# Patient Record
Sex: FEMALE | Race: WHITE | Employment: FULL TIME | URBAN - METROPOLITAN AREA
[De-identification: names, ages, dates, MRNs, and addresses within clinical notes are randomized per-mention and may not be internally consistent; named-entity substitution may affect disease eponyms.]

---

## 2023-04-26 ENCOUNTER — HOSPITAL ENCOUNTER (OUTPATIENT)
Facility: HOSPITAL | Age: 58
Discharge: HOME | End: 2023-04-29
Payer: OTHER GOVERNMENT

## 2023-04-26 DIAGNOSIS — M47.26 OTHER SPONDYLOSIS WITH RADICULOPATHY, LUMBAR REGION: ICD-10-CM

## 2023-04-26 DIAGNOSIS — M43.17 ACQUIRED SPONDYLOLISTHESIS OF LUMBOSACRAL REGION: ICD-10-CM

## 2023-04-26 LAB
ABO + RH BLD: NORMAL
ALBUMIN SERPL-MCNC: 3.8 G/DL (ref 3.4–5)
ALBUMIN/GLOB SERPL: 1 (ref 0.8–1.7)
ALP SERPL-CCNC: 51 U/L (ref 45–117)
ALT SERPL-CCNC: 32 U/L (ref 13–56)
ANION GAP SERPL CALC-SCNC: 2 MMOL/L (ref 3–18)
APTT PPP: 32.4 SEC (ref 23–36.4)
AST SERPL-CCNC: 27 U/L (ref 10–38)
BILIRUB SERPL-MCNC: 0.4 MG/DL (ref 0.2–1)
BLOOD GROUP ANTIBODIES SERPL: NORMAL
BUN SERPL-MCNC: 10 MG/DL (ref 7–18)
BUN/CREAT SERPL: 16 (ref 12–20)
CALCIUM SERPL-MCNC: 9.1 MG/DL (ref 8.5–10.1)
CHLORIDE SERPL-SCNC: 109 MMOL/L (ref 100–111)
CO2 SERPL-SCNC: 25 MMOL/L (ref 21–32)
CREAT SERPL-MCNC: 0.61 MG/DL (ref 0.6–1.3)
ERYTHROCYTE [DISTWIDTH] IN BLOOD BY AUTOMATED COUNT: 12.3 % (ref 11.6–14.5)
GLOBULIN SER CALC-MCNC: 3.8 G/DL (ref 2–4)
GLUCOSE SERPL-MCNC: 128 MG/DL (ref 74–99)
HCT VFR BLD AUTO: 41.2 % (ref 35–45)
HGB BLD-MCNC: 13.6 G/DL (ref 12–16)
INR PPP: 0.9 (ref 0.8–1.2)
MCH RBC QN AUTO: 30.6 PG (ref 24–34)
MCHC RBC AUTO-ENTMCNC: 33 G/DL (ref 31–37)
MCV RBC AUTO: 92.6 FL (ref 78–100)
NRBC # BLD: 0 K/UL (ref 0–0.01)
NRBC BLD-RTO: 0 PER 100 WBC
PLATELET # BLD AUTO: 287 K/UL (ref 135–420)
PMV BLD AUTO: 9.2 FL (ref 9.2–11.8)
POTASSIUM SERPL-SCNC: 4.4 MMOL/L (ref 3.5–5.5)
PROT SERPL-MCNC: 7.6 G/DL (ref 6.4–8.2)
PROTHROMBIN TIME: 13 SEC (ref 11.5–15.2)
RBC # BLD AUTO: 4.45 M/UL (ref 4.2–5.3)
SODIUM SERPL-SCNC: 136 MMOL/L (ref 136–145)
SPECIMEN EXP DATE BLD: NORMAL
WBC # BLD AUTO: 8.4 K/UL (ref 4.6–13.2)

## 2023-04-26 PROCEDURE — 86850 RBC ANTIBODY SCREEN: CPT

## 2023-04-26 PROCEDURE — 86901 BLOOD TYPING SEROLOGIC RH(D): CPT

## 2023-04-26 PROCEDURE — 93005 ELECTROCARDIOGRAM TRACING: CPT

## 2023-04-26 PROCEDURE — 85730 THROMBOPLASTIN TIME PARTIAL: CPT

## 2023-04-26 PROCEDURE — 36415 COLL VENOUS BLD VENIPUNCTURE: CPT

## 2023-04-26 PROCEDURE — 85027 COMPLETE CBC AUTOMATED: CPT

## 2023-04-26 PROCEDURE — 85610 PROTHROMBIN TIME: CPT

## 2023-04-26 PROCEDURE — 86900 BLOOD TYPING SEROLOGIC ABO: CPT

## 2023-04-26 PROCEDURE — 80053 COMPREHEN METABOLIC PANEL: CPT

## 2023-04-27 PROBLEM — M54.10 RADICULOPATHY OF LEG: Status: ACTIVE | Noted: 2023-04-27

## 2023-04-27 PROBLEM — M48.061 LUMBAR SPINAL STENOSIS: Status: ACTIVE | Noted: 2023-04-27

## 2023-04-27 PROBLEM — M47.816 LUMBAR SPONDYLOSIS: Status: ACTIVE | Noted: 2023-04-27

## 2023-04-27 LAB
BACTERIA SPEC CULT: NORMAL
BACTERIA SPEC CULT: NORMAL
EKG ATRIAL RATE: 61 BPM
EKG DIAGNOSIS: NORMAL
EKG P AXIS: 66 DEGREES
EKG P-R INTERVAL: 216 MS
EKG Q-T INTERVAL: 424 MS
EKG QRS DURATION: 76 MS
EKG QTC CALCULATION (BAZETT): 426 MS
EKG R AXIS: 78 DEGREES
EKG T AXIS: 66 DEGREES
EKG VENTRICULAR RATE: 61 BPM
SERVICE CMNT-IMP: NORMAL

## 2023-05-03 ENCOUNTER — ANESTHESIA EVENT (OUTPATIENT)
Facility: HOSPITAL | Age: 58
End: 2023-05-03
Payer: OTHER GOVERNMENT

## 2023-05-04 ENCOUNTER — APPOINTMENT (OUTPATIENT)
Facility: HOSPITAL | Age: 58
End: 2023-05-04
Attending: ORTHOPAEDIC SURGERY
Payer: OTHER GOVERNMENT

## 2023-05-04 ENCOUNTER — ANESTHESIA (OUTPATIENT)
Facility: HOSPITAL | Age: 58
End: 2023-05-04
Payer: OTHER GOVERNMENT

## 2023-05-04 ENCOUNTER — HOSPITAL ENCOUNTER (OUTPATIENT)
Facility: HOSPITAL | Age: 58
Discharge: HOME | End: 2023-05-06
Attending: ORTHOPAEDIC SURGERY | Admitting: ORTHOPAEDIC SURGERY
Payer: OTHER GOVERNMENT

## 2023-05-04 DIAGNOSIS — M48.061 SPINAL STENOSIS OF LUMBAR REGION AT MULTIPLE LEVELS: Primary | ICD-10-CM

## 2023-05-04 PROCEDURE — 6360000002 HC RX W HCPCS: Performed by: ORTHOPAEDIC SURGERY

## 2023-05-04 PROCEDURE — 97530 THERAPEUTIC ACTIVITIES: CPT

## 2023-05-04 PROCEDURE — 2580000003 HC RX 258: Performed by: ANESTHESIOLOGY

## 2023-05-04 PROCEDURE — 2500000003 HC RX 250 WO HCPCS: Performed by: ANESTHESIOLOGY

## 2023-05-04 PROCEDURE — 2580000003 HC RX 258: Performed by: PHYSICIAN ASSISTANT

## 2023-05-04 PROCEDURE — A4216 STERILE WATER/SALINE, 10 ML: HCPCS | Performed by: PHYSICIAN ASSISTANT

## 2023-05-04 PROCEDURE — 2580000003 HC RX 258: Performed by: ORTHOPAEDIC SURGERY

## 2023-05-04 PROCEDURE — C1763 CONN TISS, NON-HUMAN: HCPCS | Performed by: ORTHOPAEDIC SURGERY

## 2023-05-04 PROCEDURE — 3600000012 HC SURGERY LEVEL 2 ADDTL 15MIN: Performed by: ORTHOPAEDIC SURGERY

## 2023-05-04 PROCEDURE — C1713 ANCHOR/SCREW BN/BN,TIS/BN: HCPCS | Performed by: ORTHOPAEDIC SURGERY

## 2023-05-04 PROCEDURE — 2720000010 HC SURG SUPPLY STERILE: Performed by: ORTHOPAEDIC SURGERY

## 2023-05-04 PROCEDURE — 97162 PT EVAL MOD COMPLEX 30 MIN: CPT

## 2023-05-04 PROCEDURE — 3700000000 HC ANESTHESIA ATTENDED CARE: Performed by: ORTHOPAEDIC SURGERY

## 2023-05-04 PROCEDURE — 6360000002 HC RX W HCPCS: Performed by: PHYSICIAN ASSISTANT

## 2023-05-04 PROCEDURE — C1889 IMPLANT/INSERT DEVICE, NOC: HCPCS | Performed by: ORTHOPAEDIC SURGERY

## 2023-05-04 PROCEDURE — 3700000001 HC ADD 15 MINUTES (ANESTHESIA): Performed by: ORTHOPAEDIC SURGERY

## 2023-05-04 PROCEDURE — 2709999900 HC NON-CHARGEABLE SUPPLY: Performed by: ORTHOPAEDIC SURGERY

## 2023-05-04 PROCEDURE — A4217 STERILE WATER/SALINE, 500 ML: HCPCS | Performed by: ORTHOPAEDIC SURGERY

## 2023-05-04 PROCEDURE — C1776 JOINT DEVICE (IMPLANTABLE): HCPCS | Performed by: ORTHOPAEDIC SURGERY

## 2023-05-04 PROCEDURE — 6360000002 HC RX W HCPCS: Performed by: ANESTHESIOLOGY

## 2023-05-04 PROCEDURE — 2500000003 HC RX 250 WO HCPCS: Performed by: ORTHOPAEDIC SURGERY

## 2023-05-04 PROCEDURE — 97116 GAIT TRAINING THERAPY: CPT

## 2023-05-04 PROCEDURE — 2500000003 HC RX 250 WO HCPCS: Performed by: PHYSICIAN ASSISTANT

## 2023-05-04 PROCEDURE — 7100000001 HC PACU RECOVERY - ADDTL 15 MIN: Performed by: ORTHOPAEDIC SURGERY

## 2023-05-04 PROCEDURE — 6370000000 HC RX 637 (ALT 250 FOR IP): Performed by: PHYSICIAN ASSISTANT

## 2023-05-04 PROCEDURE — C9290 INJ, BUPIVACAINE LIPOSOME: HCPCS | Performed by: ORTHOPAEDIC SURGERY

## 2023-05-04 PROCEDURE — 7100000000 HC PACU RECOVERY - FIRST 15 MIN: Performed by: ORTHOPAEDIC SURGERY

## 2023-05-04 PROCEDURE — 3600000002 HC SURGERY LEVEL 2 BASE: Performed by: ORTHOPAEDIC SURGERY

## 2023-05-04 DEVICE — 4.75MM CURVED ROD, COBALT CHROME, 90MM LENGTH
Type: IMPLANTABLE DEVICE | Site: SPINE LUMBAR | Status: FUNCTIONAL
Brand: CREO

## 2023-05-04 DEVICE — DURAGEN® DURAL GRAFT MATRIX 1 PK, 1X1 DOM
Type: IMPLANTABLE DEVICE | Site: SPINE LUMBAR | Status: FUNCTIONAL
Brand: DURAGEN®

## 2023-05-04 DEVICE — CREO 4.75 THREADED LOCKING CAP
Type: IMPLANTABLE DEVICE | Site: SPINE LUMBAR | Status: FUNCTIONAL
Brand: CREO

## 2023-05-04 DEVICE — STRIP 7800320 MASTERGRAFT STRIP 20CM
Type: IMPLANTABLE DEVICE | Site: SPINE LUMBAR | Status: FUNCTIONAL
Brand: MASTERGRAFT® STRIP

## 2023-05-04 DEVICE — 4.75MM CURVED ROD, COBALT CHROME, 85MM LENGTH
Type: IMPLANTABLE DEVICE | Site: SPINE LUMBAR | Status: FUNCTIONAL
Brand: CREO

## 2023-05-04 DEVICE — SABLE SPACER, 10X26, 6-12MM, 8°
Type: IMPLANTABLE DEVICE | Site: SPINE LUMBAR | Status: FUNCTIONAL
Brand: SABLE

## 2023-05-04 DEVICE — DURASEAL® EXACT SPINAL SEALANT SYSTEM 5ML 5 PACK
Type: IMPLANTABLE DEVICE | Site: SPINE LUMBAR | Status: FUNCTIONAL
Brand: DURASEAL EXACT SPINAL SEALANT SYSTEM

## 2023-05-04 DEVICE — CREO AMP 4.75 THREADED TULIP, COCR
Type: IMPLANTABLE DEVICE | Site: SPINE LUMBAR | Status: FUNCTIONAL
Brand: CREO

## 2023-05-04 DEVICE — 6.5 - 5.5 X 40MM CORTICAL SCREW, MODULAR, CREO AMP
Type: IMPLANTABLE DEVICE | Site: SPINE LUMBAR | Status: FUNCTIONAL
Brand: CREO

## 2023-05-04 DEVICE — 6.5 - 5.5 X 35MM CORTICAL SCREW, MODULAR, CREO AMP
Type: IMPLANTABLE DEVICE | Site: SPINE LUMBAR | Status: FUNCTIONAL
Brand: CREO

## 2023-05-04 DEVICE — SABLE SPACER, 10X26, 7-14MM, 15°
Type: IMPLANTABLE DEVICE | Site: SPINE LUMBAR | Status: FUNCTIONAL
Brand: SABLE

## 2023-05-04 DEVICE — GRAFT BNE INJ 6 CC DEMINERALIZED FIBER GRFT: Type: IMPLANTABLE DEVICE | Site: SPINE LUMBAR | Status: FUNCTIONAL

## 2023-05-04 DEVICE — 8.0 - 6.5 X 40MM DOD SCREW, MODULAR, CREO AMP
Type: IMPLANTABLE DEVICE | Site: SPINE LUMBAR | Status: FUNCTIONAL
Brand: CREO

## 2023-05-04 DEVICE — 7.5 - 6.5 X 40MM CORTICAL SCREW, MODULAR, CREO AMP
Type: IMPLANTABLE DEVICE | Site: SPINE LUMBAR | Status: FUNCTIONAL
Brand: CREO

## 2023-05-04 DEVICE — 7.5 - 6.5 X 45MM CORTICAL SCREW, MODULAR, CREO AMP
Type: IMPLANTABLE DEVICE | Site: SPINE LUMBAR | Status: FUNCTIONAL
Brand: CREO

## 2023-05-04 DEVICE — 4.75 CROSS CONNECTOR, 38-50MM LONG, CREO
Type: IMPLANTABLE DEVICE | Site: SPINE LUMBAR | Status: FUNCTIONAL
Brand: CREO

## 2023-05-04 RX ORDER — LEVOTHYROXINE SODIUM 0.1 MG/1
200 TABLET ORAL DAILY
Status: DISCONTINUED | OUTPATIENT
Start: 2023-05-04 | End: 2023-05-04

## 2023-05-04 RX ORDER — SODIUM CHLORIDE 9 MG/ML
INJECTION, SOLUTION INTRAVENOUS PRN
Status: DISCONTINUED | OUTPATIENT
Start: 2023-05-04 | End: 2023-05-04 | Stop reason: HOSPADM

## 2023-05-04 RX ORDER — METOPROLOL TARTRATE 50 MG/1
100 TABLET, FILM COATED ORAL 3 TIMES DAILY
Status: DISCONTINUED | OUTPATIENT
Start: 2023-05-04 | End: 2023-05-06 | Stop reason: HOSPADM

## 2023-05-04 RX ORDER — GLYCOPYRROLATE 0.2 MG/ML
INJECTION INTRAMUSCULAR; INTRAVENOUS PRN
Status: DISCONTINUED | OUTPATIENT
Start: 2023-05-04 | End: 2023-05-04 | Stop reason: SDUPTHER

## 2023-05-04 RX ORDER — ONDANSETRON 2 MG/ML
4 INJECTION INTRAMUSCULAR; INTRAVENOUS EVERY 4 HOURS
Status: DISCONTINUED | OUTPATIENT
Start: 2023-05-04 | End: 2023-05-05

## 2023-05-04 RX ORDER — ACETAMINOPHEN 325 MG/1
650 TABLET ORAL EVERY 4 HOURS PRN
Status: DISCONTINUED | OUTPATIENT
Start: 2023-05-04 | End: 2023-05-06 | Stop reason: HOSPADM

## 2023-05-04 RX ORDER — CYCLOBENZAPRINE HCL 10 MG
10 TABLET ORAL 3 TIMES DAILY PRN
Status: DISCONTINUED | OUTPATIENT
Start: 2023-05-04 | End: 2023-05-06 | Stop reason: HOSPADM

## 2023-05-04 RX ORDER — SODIUM CHLORIDE, SODIUM LACTATE, POTASSIUM CHLORIDE, CALCIUM CHLORIDE 600; 310; 30; 20 MG/100ML; MG/100ML; MG/100ML; MG/100ML
INJECTION, SOLUTION INTRAVENOUS CONTINUOUS
Status: DISCONTINUED | OUTPATIENT
Start: 2023-05-04 | End: 2023-05-06 | Stop reason: HOSPADM

## 2023-05-04 RX ORDER — LIDOCAINE HYDROCHLORIDE 20 MG/ML
INJECTION, SOLUTION EPIDURAL; INFILTRATION; INTRACAUDAL; PERINEURAL PRN
Status: DISCONTINUED | OUTPATIENT
Start: 2023-05-04 | End: 2023-05-04 | Stop reason: SDUPTHER

## 2023-05-04 RX ORDER — TRANEXAMIC ACID 10 MG/ML
INJECTION, SOLUTION INTRAVENOUS PRN
Status: DISCONTINUED | OUTPATIENT
Start: 2023-05-04 | End: 2023-05-04 | Stop reason: SDUPTHER

## 2023-05-04 RX ORDER — NALOXONE HYDROCHLORIDE 0.4 MG/ML
0.4 INJECTION, SOLUTION INTRAMUSCULAR; INTRAVENOUS; SUBCUTANEOUS PRN
Status: DISCONTINUED | OUTPATIENT
Start: 2023-05-04 | End: 2023-05-06 | Stop reason: HOSPADM

## 2023-05-04 RX ORDER — FAMOTIDINE 20 MG/1
20 TABLET, FILM COATED ORAL 2 TIMES DAILY
Status: DISCONTINUED | OUTPATIENT
Start: 2023-05-04 | End: 2023-05-06 | Stop reason: HOSPADM

## 2023-05-04 RX ORDER — ONDANSETRON 2 MG/ML
INJECTION INTRAMUSCULAR; INTRAVENOUS PRN
Status: DISCONTINUED | OUTPATIENT
Start: 2023-05-04 | End: 2023-05-04 | Stop reason: SDUPTHER

## 2023-05-04 RX ORDER — OXYCODONE HYDROCHLORIDE 5 MG/1
10 TABLET ORAL EVERY 4 HOURS PRN
Status: DISCONTINUED | OUTPATIENT
Start: 2023-05-04 | End: 2023-05-05

## 2023-05-04 RX ORDER — FENTANYL CITRATE 50 UG/ML
INJECTION, SOLUTION INTRAMUSCULAR; INTRAVENOUS PRN
Status: DISCONTINUED | OUTPATIENT
Start: 2023-05-04 | End: 2023-05-04 | Stop reason: SDUPTHER

## 2023-05-04 RX ORDER — SODIUM CHLORIDE, SODIUM LACTATE, POTASSIUM CHLORIDE, AND CALCIUM CHLORIDE .6; .31; .03; .02 G/100ML; G/100ML; G/100ML; G/100ML
1000 INJECTION, SOLUTION INTRAVENOUS ONCE
Status: COMPLETED | OUTPATIENT
Start: 2023-05-04 | End: 2023-05-04

## 2023-05-04 RX ORDER — SUCCINYLCHOLINE/SOD CL,ISO/PF 100 MG/5ML
SYRINGE (ML) INTRAVENOUS PRN
Status: DISCONTINUED | OUTPATIENT
Start: 2023-05-04 | End: 2023-05-04 | Stop reason: SDUPTHER

## 2023-05-04 RX ORDER — ESTRADIOL 1 MG/1
1 TABLET ORAL NIGHTLY
Status: DISCONTINUED | OUTPATIENT
Start: 2023-05-04 | End: 2023-05-04

## 2023-05-04 RX ORDER — HYDROMORPHONE HYDROCHLORIDE 2 MG/ML
2 INJECTION, SOLUTION INTRAMUSCULAR; INTRAVENOUS; SUBCUTANEOUS
Status: DISPENSED | OUTPATIENT
Start: 2023-05-04 | End: 2023-05-05

## 2023-05-04 RX ORDER — KETAMINE HCL IN NACL, ISO-OSM 100MG/10ML
SYRINGE (ML) INJECTION PRN
Status: DISCONTINUED | OUTPATIENT
Start: 2023-05-04 | End: 2023-05-04 | Stop reason: SDUPTHER

## 2023-05-04 RX ORDER — HYDROMORPHONE HYDROCHLORIDE 4 MG/1
4 TABLET ORAL EVERY 4 HOURS PRN
Status: DISCONTINUED | OUTPATIENT
Start: 2023-05-04 | End: 2023-05-04

## 2023-05-04 RX ORDER — MIDAZOLAM HYDROCHLORIDE 1 MG/ML
INJECTION INTRAMUSCULAR; INTRAVENOUS PRN
Status: DISCONTINUED | OUTPATIENT
Start: 2023-05-04 | End: 2023-05-04 | Stop reason: SDUPTHER

## 2023-05-04 RX ORDER — DIPHENHYDRAMINE HCL 25 MG
25 CAPSULE ORAL EVERY 6 HOURS PRN
Status: DISCONTINUED | OUTPATIENT
Start: 2023-05-04 | End: 2023-05-06 | Stop reason: HOSPADM

## 2023-05-04 RX ORDER — SODIUM CHLORIDE 0.9 % (FLUSH) 0.9 %
5-40 SYRINGE (ML) INJECTION EVERY 12 HOURS SCHEDULED
Status: DISCONTINUED | OUTPATIENT
Start: 2023-05-04 | End: 2023-05-06 | Stop reason: HOSPADM

## 2023-05-04 RX ORDER — PROPOFOL 10 MG/ML
INJECTION, EMULSION INTRAVENOUS PRN
Status: DISCONTINUED | OUTPATIENT
Start: 2023-05-04 | End: 2023-05-04 | Stop reason: SDUPTHER

## 2023-05-04 RX ORDER — ESTRADIOL 1 MG/1
1 TABLET ORAL NIGHTLY
Status: DISCONTINUED | OUTPATIENT
Start: 2023-05-04 | End: 2023-05-06 | Stop reason: HOSPADM

## 2023-05-04 RX ORDER — HYDROMORPHONE HYDROCHLORIDE 1 MG/ML
0.5 INJECTION, SOLUTION INTRAMUSCULAR; INTRAVENOUS; SUBCUTANEOUS EVERY 5 MIN PRN
Status: DISCONTINUED | OUTPATIENT
Start: 2023-05-04 | End: 2023-05-04 | Stop reason: HOSPADM

## 2023-05-04 RX ORDER — LEVOTHYROXINE SODIUM 0.1 MG/1
200 TABLET ORAL
Status: DISCONTINUED | OUTPATIENT
Start: 2023-05-05 | End: 2023-05-06 | Stop reason: HOSPADM

## 2023-05-04 RX ORDER — TEMAZEPAM 15 MG/1
15 CAPSULE ORAL NIGHTLY PRN
Status: DISCONTINUED | OUTPATIENT
Start: 2023-05-04 | End: 2023-05-06 | Stop reason: HOSPADM

## 2023-05-04 RX ORDER — ROCURONIUM BROMIDE 10 MG/ML
INJECTION, SOLUTION INTRAVENOUS PRN
Status: DISCONTINUED | OUTPATIENT
Start: 2023-05-04 | End: 2023-05-04 | Stop reason: SDUPTHER

## 2023-05-04 RX ORDER — EPHEDRINE SULFATE/0.9% NACL/PF 50 MG/5 ML
SYRINGE (ML) INTRAVENOUS PRN
Status: DISCONTINUED | OUTPATIENT
Start: 2023-05-04 | End: 2023-05-04 | Stop reason: SDUPTHER

## 2023-05-04 RX ORDER — SODIUM CHLORIDE 0.9 % (FLUSH) 0.9 %
5-40 SYRINGE (ML) INJECTION PRN
Status: DISCONTINUED | OUTPATIENT
Start: 2023-05-04 | End: 2023-05-04 | Stop reason: HOSPADM

## 2023-05-04 RX ORDER — ZINC SULFATE 50(220)MG
50 CAPSULE ORAL DAILY
Status: DISCONTINUED | OUTPATIENT
Start: 2023-05-04 | End: 2023-05-06 | Stop reason: HOSPADM

## 2023-05-04 RX ORDER — OXYCODONE HYDROCHLORIDE 5 MG/1
5 TABLET ORAL EVERY 4 HOURS PRN
Status: DISCONTINUED | OUTPATIENT
Start: 2023-05-04 | End: 2023-05-05

## 2023-05-04 RX ORDER — SODIUM CHLORIDE 9 MG/ML
INJECTION, SOLUTION INTRAVENOUS PRN
Status: DISCONTINUED | OUTPATIENT
Start: 2023-05-04 | End: 2023-05-06 | Stop reason: HOSPADM

## 2023-05-04 RX ORDER — SODIUM CHLORIDE 0.9 % (FLUSH) 0.9 %
5-40 SYRINGE (ML) INJECTION PRN
Status: DISCONTINUED | OUTPATIENT
Start: 2023-05-04 | End: 2023-05-06 | Stop reason: HOSPADM

## 2023-05-04 RX ORDER — SODIUM CHLORIDE 0.9 % (FLUSH) 0.9 %
5-40 SYRINGE (ML) INJECTION EVERY 12 HOURS SCHEDULED
Status: DISCONTINUED | OUTPATIENT
Start: 2023-05-04 | End: 2023-05-04 | Stop reason: HOSPADM

## 2023-05-04 RX ORDER — ZINC SULFATE 50(220)MG
50 CAPSULE ORAL DAILY
Status: DISCONTINUED | OUTPATIENT
Start: 2023-05-04 | End: 2023-05-04

## 2023-05-04 RX ORDER — DIPHENHYDRAMINE HYDROCHLORIDE 50 MG/ML
12.5 INJECTION INTRAMUSCULAR; INTRAVENOUS EVERY 6 HOURS PRN
Status: DISCONTINUED | OUTPATIENT
Start: 2023-05-04 | End: 2023-05-06 | Stop reason: HOSPADM

## 2023-05-04 RX ADMIN — HYDROMORPHONE HYDROCHLORIDE 0.25 MG: 1 INJECTION, SOLUTION INTRAMUSCULAR; INTRAVENOUS; SUBCUTANEOUS at 12:44

## 2023-05-04 RX ADMIN — ROCURONIUM BROMIDE 10 MG: 10 INJECTION, SOLUTION INTRAVENOUS at 11:34

## 2023-05-04 RX ADMIN — PROPOFOL 200 MG: 10 INJECTION, EMULSION INTRAVENOUS at 09:32

## 2023-05-04 RX ADMIN — HYDROMORPHONE HYDROCHLORIDE 0.5 MG: 1 INJECTION, SOLUTION INTRAMUSCULAR; INTRAVENOUS; SUBCUTANEOUS at 12:47

## 2023-05-04 RX ADMIN — ROCURONIUM BROMIDE 10 MG: 10 INJECTION, SOLUTION INTRAVENOUS at 12:10

## 2023-05-04 RX ADMIN — ONDANSETRON 4 MG: 2 INJECTION INTRAMUSCULAR; INTRAVENOUS at 20:00

## 2023-05-04 RX ADMIN — SODIUM CHLORIDE, SODIUM LACTATE, POTASSIUM CHLORIDE, AND CALCIUM CHLORIDE 1000 ML: 600; 310; 30; 20 INJECTION, SOLUTION INTRAVENOUS at 08:07

## 2023-05-04 RX ADMIN — Medication 15 MG: at 09:58

## 2023-05-04 RX ADMIN — Medication 10 MG: at 10:13

## 2023-05-04 RX ADMIN — HYDROMORPHONE HYDROCHLORIDE 2 MG: 2 INJECTION INTRAMUSCULAR; INTRAVENOUS; SUBCUTANEOUS at 15:48

## 2023-05-04 RX ADMIN — LIDOCAINE HYDROCHLORIDE 80 MG: 20 INJECTION, SOLUTION EPIDURAL; INFILTRATION; INTRACAUDAL; PERINEURAL at 09:32

## 2023-05-04 RX ADMIN — ROCURONIUM BROMIDE 10 MG: 10 INJECTION, SOLUTION INTRAVENOUS at 09:32

## 2023-05-04 RX ADMIN — SODIUM CHLORIDE, SODIUM LACTATE, POTASSIUM CHLORIDE, AND CALCIUM CHLORIDE: 600; 310; 30; 20 INJECTION, SOLUTION INTRAVENOUS at 10:02

## 2023-05-04 RX ADMIN — HYDROMORPHONE HYDROCHLORIDE 2 MG: 2 INJECTION INTRAMUSCULAR; INTRAVENOUS; SUBCUTANEOUS at 20:06

## 2023-05-04 RX ADMIN — Medication 10 MG: at 09:46

## 2023-05-04 RX ADMIN — ONDANSETRON HYDROCHLORIDE 4 MG: 2 INJECTION INTRAMUSCULAR; INTRAVENOUS at 13:19

## 2023-05-04 RX ADMIN — Medication 10 MG: at 10:30

## 2023-05-04 RX ADMIN — MIDAZOLAM 2 MG: 1 INJECTION INTRAMUSCULAR; INTRAVENOUS at 09:22

## 2023-05-04 RX ADMIN — Medication 10 MG: at 10:36

## 2023-05-04 RX ADMIN — SUGAMMADEX 179 MG: 100 INJECTION, SOLUTION INTRAVENOUS at 13:52

## 2023-05-04 RX ADMIN — Medication 10 MG: at 09:39

## 2023-05-04 RX ADMIN — ONDANSETRON 4 MG: 2 INJECTION INTRAMUSCULAR; INTRAVENOUS at 16:02

## 2023-05-04 RX ADMIN — ROCURONIUM BROMIDE 10 MG: 10 INJECTION, SOLUTION INTRAVENOUS at 10:15

## 2023-05-04 RX ADMIN — HYDROMORPHONE HYDROCHLORIDE 0.25 MG: 1 INJECTION, SOLUTION INTRAMUSCULAR; INTRAVENOUS; SUBCUTANEOUS at 12:41

## 2023-05-04 RX ADMIN — SODIUM CHLORIDE, SODIUM LACTATE, POTASSIUM CHLORIDE, AND CALCIUM CHLORIDE: 600; 310; 30; 20 INJECTION, SOLUTION INTRAVENOUS at 13:20

## 2023-05-04 RX ADMIN — Medication 20 MG: at 09:31

## 2023-05-04 RX ADMIN — Medication 10 MG: at 12:30

## 2023-05-04 RX ADMIN — Medication 10 MG: at 13:01

## 2023-05-04 RX ADMIN — VANCOMYCIN HYDROCHLORIDE 1250 MG: 10 INJECTION, POWDER, LYOPHILIZED, FOR SOLUTION INTRAVENOUS at 20:11

## 2023-05-04 RX ADMIN — SODIUM CHLORIDE, SODIUM LACTATE, POTASSIUM CHLORIDE, AND CALCIUM CHLORIDE 1000 ML: 600; 310; 30; 20 INJECTION, SOLUTION INTRAVENOUS at 09:22

## 2023-05-04 RX ADMIN — FENTANYL CITRATE 100 MCG: 50 INJECTION, SOLUTION INTRAMUSCULAR; INTRAVENOUS at 09:32

## 2023-05-04 RX ADMIN — FAMOTIDINE 20 MG: 10 INJECTION, SOLUTION INTRAVENOUS at 20:09

## 2023-05-04 RX ADMIN — Medication 15 MG: at 09:53

## 2023-05-04 RX ADMIN — GLYCOPYRROLATE 0.2 MG: 0.2 INJECTION INTRAMUSCULAR; INTRAVENOUS at 09:22

## 2023-05-04 RX ADMIN — ROCURONIUM BROMIDE 5 MG: 10 INJECTION, SOLUTION INTRAVENOUS at 12:52

## 2023-05-04 RX ADMIN — VANCOMYCIN HYDROCHLORIDE 1250 MG: 10 INJECTION, POWDER, LYOPHILIZED, FOR SOLUTION INTRAVENOUS at 08:07

## 2023-05-04 RX ADMIN — ESTRADIOL 1 MG: 1 TABLET ORAL at 21:55

## 2023-05-04 RX ADMIN — TRANEXAMIC ACID 1 G: 10 INJECTION, SOLUTION INTRAVENOUS at 09:24

## 2023-05-04 RX ADMIN — SODIUM CHLORIDE, POTASSIUM CHLORIDE, SODIUM LACTATE AND CALCIUM CHLORIDE: 600; 310; 30; 20 INJECTION, SOLUTION INTRAVENOUS at 15:11

## 2023-05-04 RX ADMIN — SODIUM CHLORIDE, SODIUM LACTATE, POTASSIUM CHLORIDE, AND CALCIUM CHLORIDE: 600; 310; 30; 20 INJECTION, SOLUTION INTRAVENOUS at 08:07

## 2023-05-04 RX ADMIN — METOPROLOL TARTRATE 100 MG: 50 TABLET, FILM COATED ORAL at 16:02

## 2023-05-04 RX ADMIN — OXYCODONE 10 MG: 5 TABLET ORAL at 21:54

## 2023-05-04 RX ADMIN — ROCURONIUM BROMIDE 40 MG: 10 INJECTION, SOLUTION INTRAVENOUS at 09:43

## 2023-05-04 RX ADMIN — Medication 100 MG: at 09:32

## 2023-05-04 RX ADMIN — OXYCODONE 10 MG: 5 TABLET ORAL at 17:57

## 2023-05-04 RX ADMIN — Medication 12.5 MG: at 09:50

## 2023-05-04 ASSESSMENT — PAIN DESCRIPTION - DESCRIPTORS
DESCRIPTORS: ACHING
DESCRIPTORS: BURNING

## 2023-05-04 ASSESSMENT — LIFESTYLE VARIABLES: SMOKING_STATUS: 1

## 2023-05-04 ASSESSMENT — PAIN DESCRIPTION - LOCATION
LOCATION: BACK

## 2023-05-04 ASSESSMENT — PAIN - FUNCTIONAL ASSESSMENT: PAIN_FUNCTIONAL_ASSESSMENT: 0-10

## 2023-05-04 ASSESSMENT — PAIN DESCRIPTION - ORIENTATION
ORIENTATION: MID

## 2023-05-04 ASSESSMENT — PAIN SCALES - GENERAL
PAINLEVEL_OUTOF10: 9
PAINLEVEL_OUTOF10: 10
PAINLEVEL_OUTOF10: 0
PAINLEVEL_OUTOF10: 10
PAINLEVEL_OUTOF10: 8

## 2023-05-04 NOTE — PERIOP NOTE
TRANSFER - IN REPORT:    Verbal report received from Dr. Graciela Ordonez RN  on Armida Bell  being received from OR for routine progression of patient care      Report consisted of patient's Situation, Background, Assessment and   Recommendations(SBAR). Information from the following report(s) Adult Overview, Surgery Report, Intake/Output, and MAR was reviewed with the receiving nurse. Opportunity for questions and clarification was provided. Assessment completed upon patient's arrival to unit and care assumed.

## 2023-05-04 NOTE — PROGRESS NOTES
Received 2mg IV dilaudid Q2 order . Warning message discuss with Dr Sangeeta Neves who verified order an advice to override warning.

## 2023-05-04 NOTE — OP NOTE
OPERATIVE NOTE    Patient: Josh Jimenez MRN: [id number]  SSN: [id number]    YOB: 1965  Age: 59 y.o. Sex: female      Indications: This is a 59y.o. year-old female who presents with back and leg pain. She was positive for DDD/Stenosis/HNP/Flat Back/Radiculopathy. The patient was admitted for surgery as conservative measures have failed. Date of Procedure: 5/4/2023     Preoperative Diagnosis: LUMBAR SPONDYLISIS WITH RADICULOPATHY AND LUMBOSACRAL SPONDYLOLISTHESIS    Postoperative Diagnosis: * No post-op diagnosis entered *      Procedure: Procedure(s):  LUMBAR 3 SACRAL 1 LAMINECTOMY, INSTRUMENTED FUSION WITH CAGES WITH C-ARM OP 23    Surgeon: Jarocho Barr DO,Surgical Assistant: Jaison Stewart PA-C    Assistant: Circulator: Ángel Velazco RN  Radiology Technologist: Chicho Rubalcava Circulator: Trae Vasquez RN  Scrub Person First: Lowell Brian  Scrub Person Second: Juventino Mitchell RN  Physician Assistant: Stephen Taylor PA-C    Anesthesia: GETA  Estimated Blood Loss: 250cc    Specimens: * No specimens in log *     Drains: Hemovac    Implants:   Implant Name Type Inv.  Item Serial No.  Lot No. LRB No. Used Action   GRAFT BNE INJ 6 CC DEMINERALIZED FIBER GRFT - [id number]  GRAFT BNE INJ 6 CC DEMINERALIZED FIBER GRFT [id number] [employer] TECH-Ozy [employer]  N/A 1 Implanted   GRAFT BNE INJ 6 CC DEMINERALIZED FIBER GRFT - [id number]  GRAFT BNE INJ 6 CC DEMINERALIZED FIBER GRFT [id number] [employer] SPIN[employer] TECH-[employer]  N/A 1 Implanted   GRAFT BNE SUB L20CM STRP MASTERGRFT - [id number]  GRAFT BNE SUB L20CM STRP MASTERGRFT  [facility] [id number] N/A 1 Implanted   [facility]    [employer]-WD [id number] N/A 1 Implanted   [employer]    Material [employer]-WD [id number] N/A 3 Implanted   [employer]CS    [employer]WD [id number] N/A 1 Implanted   [employer]    [employer]WD [id number] N/A 1 Implanted   AMP DOD    [employer] [facility]- [id number] N/A 2 Implanted   SPACER SPNL 8 DEG

## 2023-05-04 NOTE — INTERVAL H&P NOTE
Update History & Physical    The patient's History and Physical of May 4, chart was reviewed with the patient and I examined the patient. There was no change. The surgical site was confirmed by the patient and me. Plan: The risks, benefits, expected outcome, and alternative to the recommended procedure have been discussed with the patient. Patient understands and wants to proceed with the procedure.      Electronically signed by Nikita Francisco MD on 5/4/2023 at 8:46 AM

## 2023-05-04 NOTE — ANESTHESIA POSTPROCEDURE EVALUATION
Department of Anesthesiology  Postprocedure Note    Patient:  Kane Mittal  MRN: [id number]  YOB: 1965  Date of evaluation: 5/4/2023      Procedure Summary     Date: 05/04/23 Room / Location: THE [facility] 08 / [facility] MAIN OR    Anesthesia Start: 0932 Anesthesia Stop: 2827    Procedure: LUMBAR 3 SACRAL 1 LAMINECTOMY, INSTRUMENTED FUSION WITH CAGES WITH C-ARM OP 23 (Spine Lumbar) Diagnosis:       Other spondylosis with radiculopathy, lumbar region      Acquired spondylolisthesis of lumbosacral region      (LUMBAR SPONDYLISIS WITH RADICULOPATHY AND LUMBOSACRAL SPONDYLOLISTHESIS)    Surgeons: Kat Rowe MD Responsible Provider: Nuvia Devi MD    Anesthesia Type: General ASA Status: 2          Anesthesia Type: General    Guerita Phase I: Guerita Score: 8    Guerita Phase II:        Anesthesia Post Evaluation    Patient location during evaluation: PACU  Patient participation: complete - patient participated  Level of consciousness: awake  Pain score: 0  Airway patency: patent  Nausea & Vomiting: no nausea and no vomiting  Complications: no  Cardiovascular status: blood pressure returned to baseline  Respiratory status: acceptable  Hydration status: stable  Multimodal analgesia pain management approach

## 2023-05-04 NOTE — PROGRESS NOTES
Physical Therapy Goals  Initiated 5/4/2023 and to be met within 3 days. Patient will supine to/from sit with SBA. Patient will perform sit<>stand transfers with SB/CGA. Patient will ambulate 100 ft with RW and SB-CGA. Patient will negotiate flight of stairs with handrails and CGA. []  Patient has met MD mobilization josé for d/c home   []  Recommend HH with 24 hour adult care   [x]  Benefit from additional acute PT session to address:  Functional mobility and ROM/motor performance deficits      PHYSICAL THERAPY EVALUATION    Patient: Letha Nolasco (59 y.o. female)  Date: 5/4/2023  Primary Diagnosis: Other spondylosis with radiculopathy, lumbar region [M47.26]  Acquired spondylolisthesis of lumbosacral region [M43.17]  Spinal stenosis of lumbar region at multiple levels [M48.061]  Procedure(s) (LRB):  LUMBAR 3 SACRAL 1 LAMINECTOMY, INSTRUMENTED FUSION WITH CAGES WITH C-ARM OP 23 (N/A) Day of Surgery   Precautions: Fall Risk, Other (comment) (Back; LSO when up)  PLOF: Independent ambulation without AD    ASSESSMENT :  Based on the objective data described below, the patient presents with functional mobility limited primarily by pain. Pt with impaired bed mobility, transfers, gait and with decrd endurance for activity as noted above following above surgery. Pt c/o pain 8/10 on PT arrival and reluctant to participate initially, but complied with encouragement from PT and pt spouse. Pt with O2 at 2 Lnc, IV, walton catheter and incisional drain in place. /78, HR 86, O2 sat 96. Pt requires additional time and vc for all mobility tasks. Rolling L/R with SBA, supine > R side lying >sit EOB min assist/additional time. LSO donned by PT. Pt sit to stand with min assist/RW. Patient ambulated 3 feet with RW, GB applied, min A. Slow, step to, antalgic gt. Mild dizziness on sitting/standing and c/o feet feeling numb in stance.   Pt returned to bed and left in R SL position initially, however, only tolerated

## 2023-05-04 NOTE — ANESTHESIA PRE PROCEDURE
08:42 AM       CMP:   Lab Results   Component Value Date/Time     04/26/2023 08:42 AM    K 4.4 04/26/2023 08:42 AM     04/26/2023 08:42 AM    CO2 25 04/26/2023 08:42 AM    BUN 10 04/26/2023 08:42 AM    CREATININE 0.61 04/26/2023 08:42 AM    LABGLOM >60 04/26/2023 08:42 AM    GLUCOSE 128 04/26/2023 08:42 AM    PROT 7.6 04/26/2023 08:42 AM    CALCIUM 9.1 04/26/2023 08:42 AM    BILITOT 0.4 04/26/2023 08:42 AM    ALKPHOS 51 04/26/2023 08:42 AM    AST 27 04/26/2023 08:42 AM    ALT 32 04/26/2023 08:42 AM       POC Tests: No results for input(s): POCGLU, POCNA, POCK, POCCL, POCBUN, POCHEMO, POCHCT in the last 72 hours. Coags:   Lab Results   Component Value Date/Time    PROTIME 13.0 04/26/2023 08:42 AM    INR 0.9 04/26/2023 08:42 AM    APTT 32.4 04/26/2023 08:42 AM       HCG (If Applicable): No results found for: PREGTESTUR, PREGSERUM, HCG, HCGQUANT     ABGs: No results found for: PHART, PO2ART, JYR4BKC, TFD1FAF, BEART, W7IAFNYH     Type & Screen (If Applicable):  No results found for: LABABO, LABRH    Drug/Infectious Status (If Applicable):  No results found for: HIV, HEPCAB    COVID-19 Screening (If Applicable): No results found for: COVID19        Anesthesia Evaluation  Patient summary reviewed and Nursing notes reviewed no history of anesthetic complications:   Airway: Mallampati: II  TM distance: >3 FB   Neck ROM: full  Mouth opening: < 3 FB   Dental: normal exam   (+) implants      Pulmonary:Negative Pulmonary ROS breath sounds clear to auscultation  (+) current smoker          Patient smoked on day of surgery.                  Cardiovascular:  Exercise tolerance: good (>4 METS),   (+) hypertension: mild,         Rhythm: regular  Rate: normal                    Neuro/Psych:   Negative Neuro/Psych ROS  (+) neuromuscular disease:, headaches:,             GI/Hepatic/Renal: Neg GI/Hepatic/Renal ROS  (+) GERD: well controlled, morbid obesity          Endo/Other: Negative Endo/Other ROS

## 2023-05-04 NOTE — PERIOP NOTE
Reviewed PTA medication list with patient/caregiver and patient/caregiver denies any additional medications. Patient admits to having a responsible adult care for them at home for at least 24 hours after surgery. Patient encouraged to use gown warming system and informed that using said warming gown to regulate body temperature prior to a procedure has been shown to help reduce the risks of blood clots and infection. Patient's pharmacy of choice verified and documented in PTA medication section.     Dual skin assessment & fall risk band verification completed with Alma Delia Luque RN.

## 2023-05-04 NOTE — PERIOP NOTE
TRANSFER - OUT REPORT:    Verbal report given to [facility] RN  on Grace Lowe  being transferred to [facility]  for routine progression of patient care       Report consisted of patient's Situation, Background, Assessment and   Recommendations(SBAR). Information from the following report(s) Adult Overview, Surgery Report, Intake/Output, MAR, and Recent Results was reviewed with the receiving nurse. [place] Assessment: No data recorded  Lines:   Peripheral IV 05/04/23 Right;Posterior Hand (Active)   Site Assessment Clean, dry & intact 05/04/23 1405   Line Status Infusing 05/04/23 1405        Opportunity for questions and clarification was provided.       Patient transported with:  O2 @ 2lpm, Registered Nurse, and [facility]

## 2023-05-04 NOTE — PERIOP NOTE
18 [facility] made aware that SBAR is ready for review. Patient assigned room #216.  Alexander Iyer RN will be the nurse

## 2023-05-04 NOTE — PROGRESS NOTES
Dr Zachariah davalos. For Pain medication clarification. Patient  very upset because patient doesn't have IV dilaudid orders. Dilaudid 4 mg  was offered. Patient's  refused.

## 2023-05-05 LAB
ANION GAP SERPL CALC-SCNC: 3 MMOL/L (ref 3–18)
BUN SERPL-MCNC: 9 MG/DL (ref 7–18)
BUN/CREAT SERPL: 13 (ref 12–20)
CALCIUM SERPL-MCNC: 8.2 MG/DL (ref 8.5–10.1)
CHLORIDE SERPL-SCNC: 108 MMOL/L (ref 100–111)
CO2 SERPL-SCNC: 27 MMOL/L (ref 21–32)
CREAT SERPL-MCNC: 0.67 MG/DL (ref 0.6–1.3)
ERYTHROCYTE [DISTWIDTH] IN BLOOD BY AUTOMATED COUNT: 13.1 % (ref 11.6–14.5)
GLUCOSE SERPL-MCNC: 129 MG/DL (ref 74–99)
HCT VFR BLD AUTO: 34.5 % (ref 35–45)
HGB BLD-MCNC: 10.8 G/DL (ref 12–16)
MCH RBC QN AUTO: 30.7 PG (ref 24–34)
MCHC RBC AUTO-ENTMCNC: 31.3 G/DL (ref 31–37)
MCV RBC AUTO: 98 FL (ref 78–100)
NRBC # BLD: 0 K/UL (ref 0–0.01)
NRBC BLD-RTO: 0 PER 100 WBC
PLATELET # BLD AUTO: 225 K/UL (ref 135–420)
PMV BLD AUTO: 9 FL (ref 9.2–11.8)
POTASSIUM SERPL-SCNC: 3.9 MMOL/L (ref 3.5–5.5)
RBC # BLD AUTO: 3.52 M/UL (ref 4.2–5.3)
SODIUM SERPL-SCNC: 138 MMOL/L (ref 136–145)
WBC # BLD AUTO: 12.9 K/UL (ref 4.6–13.2)

## 2023-05-05 PROCEDURE — 6370000000 HC RX 637 (ALT 250 FOR IP): Performed by: PHYSICIAN ASSISTANT

## 2023-05-05 PROCEDURE — 6370000000 HC RX 637 (ALT 250 FOR IP): Performed by: ORTHOPAEDIC SURGERY

## 2023-05-05 PROCEDURE — 2580000003 HC RX 258: Performed by: ORTHOPAEDIC SURGERY

## 2023-05-05 PROCEDURE — 2500000003 HC RX 250 WO HCPCS: Performed by: PHYSICIAN ASSISTANT

## 2023-05-05 PROCEDURE — 6360000002 HC RX W HCPCS: Performed by: ORTHOPAEDIC SURGERY

## 2023-05-05 PROCEDURE — A4216 STERILE WATER/SALINE, 10 ML: HCPCS | Performed by: PHYSICIAN ASSISTANT

## 2023-05-05 PROCEDURE — 6360000002 HC RX W HCPCS: Performed by: PHYSICIAN ASSISTANT

## 2023-05-05 PROCEDURE — 2700000000 HC OXYGEN THERAPY PER DAY

## 2023-05-05 PROCEDURE — 36415 COLL VENOUS BLD VENIPUNCTURE: CPT

## 2023-05-05 PROCEDURE — 85027 COMPLETE CBC AUTOMATED: CPT

## 2023-05-05 PROCEDURE — 97116 GAIT TRAINING THERAPY: CPT

## 2023-05-05 PROCEDURE — 2580000003 HC RX 258: Performed by: PHYSICIAN ASSISTANT

## 2023-05-05 PROCEDURE — 97530 THERAPEUTIC ACTIVITIES: CPT

## 2023-05-05 PROCEDURE — 80048 BASIC METABOLIC PNL TOTAL CA: CPT

## 2023-05-05 RX ORDER — 0.9 % SODIUM CHLORIDE 0.9 %
1000 INTRAVENOUS SOLUTION INTRAVENOUS ONCE
Status: CANCELLED | OUTPATIENT
Start: 2023-05-05 | End: 2023-05-05

## 2023-05-05 RX ORDER — HYDROMORPHONE HYDROCHLORIDE 4 MG/1
4 TABLET ORAL EVERY 4 HOURS PRN
Status: DISCONTINUED | OUTPATIENT
Start: 2023-05-06 | End: 2023-05-06 | Stop reason: HOSPADM

## 2023-05-05 RX ORDER — KETOROLAC TROMETHAMINE 30 MG/ML
INJECTION, SOLUTION INTRAMUSCULAR; INTRAVENOUS
Status: DISPENSED
Start: 2023-05-05 | End: 2023-05-06

## 2023-05-05 RX ORDER — ONDANSETRON 2 MG/ML
4 INJECTION INTRAMUSCULAR; INTRAVENOUS EVERY 4 HOURS PRN
Status: DISCONTINUED | OUTPATIENT
Start: 2023-05-05 | End: 2023-05-06 | Stop reason: HOSPADM

## 2023-05-05 RX ORDER — HYDROCODONE BITARTRATE AND ACETAMINOPHEN 5; 325 MG/1; MG/1
1 TABLET ORAL EVERY 4 HOURS PRN
Status: DISCONTINUED | OUTPATIENT
Start: 2023-05-05 | End: 2023-05-05

## 2023-05-05 RX ORDER — KETOROLAC TROMETHAMINE 15 MG/ML
30 INJECTION, SOLUTION INTRAMUSCULAR; INTRAVENOUS EVERY 6 HOURS
Status: DISCONTINUED | OUTPATIENT
Start: 2023-05-05 | End: 2023-05-06 | Stop reason: SDUPTHER

## 2023-05-05 RX ORDER — HYDROMORPHONE HYDROCHLORIDE 4 MG/1
4 TABLET ORAL EVERY 4 HOURS PRN
Qty: 28 TABLET | Refills: 0 | Status: SHIPPED | OUTPATIENT
Start: 2023-05-06 | End: 2023-05-09

## 2023-05-05 RX ORDER — KETOROLAC TROMETHAMINE 30 MG/ML
30 INJECTION, SOLUTION INTRAMUSCULAR; INTRAVENOUS EVERY 6 HOURS
Status: DISCONTINUED | OUTPATIENT
Start: 2023-05-05 | End: 2023-05-05 | Stop reason: SDUPTHER

## 2023-05-05 RX ORDER — HYDROCODONE BITARTRATE AND ACETAMINOPHEN 5; 325 MG/1; MG/1
2 TABLET ORAL EVERY 4 HOURS PRN
Status: DISCONTINUED | OUTPATIENT
Start: 2023-05-05 | End: 2023-05-06 | Stop reason: HOSPADM

## 2023-05-05 RX ORDER — HYDROCODONE BITARTRATE AND ACETAMINOPHEN 5; 325 MG/1; MG/1
1 TABLET ORAL EVERY 4 HOURS PRN
Status: DISCONTINUED | OUTPATIENT
Start: 2023-05-05 | End: 2023-05-06 | Stop reason: HOSPADM

## 2023-05-05 RX ORDER — KETOROLAC TROMETHAMINE 15 MG/ML
30 INJECTION, SOLUTION INTRAMUSCULAR; INTRAVENOUS EVERY 6 HOURS
Status: DISCONTINUED | OUTPATIENT
Start: 2023-05-05 | End: 2023-05-05

## 2023-05-05 RX ORDER — CYCLOBENZAPRINE HCL 10 MG
10 TABLET ORAL 3 TIMES DAILY PRN
Qty: 60 TABLET | Refills: 0 | Status: SHIPPED | OUTPATIENT
Start: 2023-05-05 | End: 2023-05-15

## 2023-05-05 RX ORDER — 0.9 % SODIUM CHLORIDE 0.9 %
1000 INTRAVENOUS SOLUTION INTRAVENOUS ONCE
Status: COMPLETED | OUTPATIENT
Start: 2023-05-05 | End: 2023-05-05

## 2023-05-05 RX ADMIN — SODIUM CHLORIDE 1000 ML: 900 INJECTION, SOLUTION INTRAVENOUS at 19:06

## 2023-05-05 RX ADMIN — ONDANSETRON 4 MG: 2 INJECTION INTRAMUSCULAR; INTRAVENOUS at 03:25

## 2023-05-05 RX ADMIN — SODIUM CHLORIDE 1000 ML: 9 INJECTION, SOLUTION INTRAVENOUS at 16:32

## 2023-05-05 RX ADMIN — OXYCODONE 10 MG: 5 TABLET ORAL at 03:25

## 2023-05-05 RX ADMIN — KETOROLAC TROMETHAMINE 30 MG: 30 INJECTION, SOLUTION INTRAMUSCULAR at 16:27

## 2023-05-05 RX ADMIN — SODIUM CHLORIDE, POTASSIUM CHLORIDE, SODIUM LACTATE AND CALCIUM CHLORIDE: 600; 310; 30; 20 INJECTION, SOLUTION INTRAVENOUS at 22:46

## 2023-05-05 RX ADMIN — HYDROCODONE BITARTRATE AND ACETAMINOPHEN 1 TABLET: 5; 325 TABLET ORAL at 13:51

## 2023-05-05 RX ADMIN — HYDROCODONE BITARTRATE AND ACETAMINOPHEN 2 TABLET: 5; 325 TABLET ORAL at 20:04

## 2023-05-05 RX ADMIN — HYDROMORPHONE HYDROCHLORIDE 2 MG: 2 INJECTION INTRAMUSCULAR; INTRAVENOUS; SUBCUTANEOUS at 10:37

## 2023-05-05 RX ADMIN — ONDANSETRON 4 MG: 2 INJECTION INTRAMUSCULAR; INTRAVENOUS at 00:02

## 2023-05-05 RX ADMIN — OXYCODONE 10 MG: 5 TABLET ORAL at 07:45

## 2023-05-05 RX ADMIN — SODIUM CHLORIDE, PRESERVATIVE FREE 10 ML: 5 INJECTION INTRAVENOUS at 22:48

## 2023-05-05 RX ADMIN — HYDROMORPHONE HYDROCHLORIDE 2 MG: 2 INJECTION INTRAMUSCULAR; INTRAVENOUS; SUBCUTANEOUS at 05:21

## 2023-05-05 RX ADMIN — HYDROMORPHONE HYDROCHLORIDE 2 MG: 2 INJECTION INTRAMUSCULAR; INTRAVENOUS; SUBCUTANEOUS at 00:19

## 2023-05-05 RX ADMIN — FAMOTIDINE 20 MG: 20 TABLET, FILM COATED ORAL at 22:46

## 2023-05-05 RX ADMIN — METOPROLOL TARTRATE 100 MG: 50 TABLET, FILM COATED ORAL at 22:46

## 2023-05-05 RX ADMIN — ONDANSETRON 4 MG: 2 INJECTION INTRAMUSCULAR; INTRAVENOUS at 07:14

## 2023-05-05 RX ADMIN — CYCLOBENZAPRINE 10 MG: 10 TABLET, FILM COATED ORAL at 15:12

## 2023-05-05 RX ADMIN — ONDANSETRON 4 MG: 2 INJECTION INTRAMUSCULAR; INTRAVENOUS at 10:37

## 2023-05-05 ASSESSMENT — PAIN DESCRIPTION - DESCRIPTORS
DESCRIPTORS: ACHING

## 2023-05-05 ASSESSMENT — PAIN DESCRIPTION - LOCATION
LOCATION: BACK

## 2023-05-05 ASSESSMENT — PAIN SCALES - GENERAL
PAINLEVEL_OUTOF10: 7
PAINLEVEL_OUTOF10: 8
PAINLEVEL_OUTOF10: 7
PAINLEVEL_OUTOF10: 8
PAINLEVEL_OUTOF10: 7
PAINLEVEL_OUTOF10: 8

## 2023-05-05 ASSESSMENT — PAIN DESCRIPTION - ORIENTATION
ORIENTATION: MID
ORIENTATION: LOWER
ORIENTATION: LOWER

## 2023-05-05 NOTE — PROGRESS NOTES
Physical Therapy    1447: Pt refusing to participate in PT due to pain is a 7/10, wants more pain medication due to the last dose not working. Will notify nurse.

## 2023-05-05 NOTE — CARE COORDINATION
D/C plan: Home w/ [facility]. Spouse to transport. CM met w/ pt and spouse at bedside. Confirmed information on the pt's face sheet. Pt lives at home. Her spouse will be flying back to [place] in approx 1 week. Pt is independent in all ADLs and IDLs without an assistive device at baseline. Pt does have a RW for recovery. Discussed HH at d/c and provided FOC. Pt will d/c w/ [facility]. Pt's spouse inquired about additional care in the home s/p his return to [place] [address]. CM advised him that would be an out of pocket expense. Mr. Florence Barton stated he would call his insurance to inquire about having additional services covered by them. CM advised him that resources for private duty care will be printed out on the pt's AVS for him to have in case he needed contact information. Pt and her spouse verbalized an understanding. Anticipate d/c home w/ HH. Spouse to transport. Case Management Assessment  Initial Evaluation    Date/Time of Evaluation: 5/5/2023 2:42 PM  Assessment Completed by: Ailyn Birmingham RN    If patient is discharged prior to next notation, then this note serves as note for discharge by case management. Patient Name: Teresa Gibbons                   YOB: 1965  Diagnosis: Other spondylosis with radiculopathy, lumbar region [M47.26]  Acquired spondylolisthesis of lumbosacral region [M43.17]  Spinal stenosis of lumbar region at multiple levels [M48.061]                   Date / Time: 5/4/2023  6:38 AM    Patient Admission Status: Outpatient in a bed   Readmission Risk (Low < 19, Mod (19-27), High > 27): No data recorded  Current PCP: Maurice Ma MD  PCP verified by CM? Yes    Chart Reviewed: Yes      History Provided by: Patient, Significant Other  Patient Orientation: Alert and Oriented    Patient Cognition: Alert    Hospitalization in the last 30 days (Readmission):  No    If yes, Readmission Assessment in  Navigator will be completed.     Advance Directives:      Code Status:

## 2023-05-05 NOTE — PROGRESS NOTES
1930-Bedside report received from Farooq Wolff, [facility]. Pt A & O X 4. Pain at 5. Pt without any issues. Call light within reach. 2005 - Pt A & O x 4. IV to RH, intact and patent. LS clear, dim,On  2L NC. Drsg to back dry and intact. Drain charged and patent with a sero sang  drge. Pt denies nausea. Abd. Soft, Non-tender and ND, +BS. Pain at 10. Pt and  educated on pain management, importance of ambulation and IS. Call light within reach. 0020-Pt says she cannot get up tonight, very afraid to move even in bed, assisted to side lying position, says will get up in AM.    0330-Pt medicated for pain, getting IV Dilaudid alternately with oxycodone. Pt has been comfortable, but now verbalizes that her pain is not being well managed. Reports that she should not be in pain at all. Pt educated on pain goals and the risk of taking too much narcotics duglas. with soft Bps like she has. Made aware that pain may not be at zero at this stage. Pt verbalized understanding. Pt repositioned and reassured. No other issues at this time. 0525-Pls address pain control. Pt does does not seem to understand that she cannot not be in pain, still getting IV diaudid. Made aware of the need to wean off of IV pain meds in prep for discharge says she does not think she can go home today. Pt's mobility in bed very poor too, will leave walton in for the same reason. 0750-Judit to Follow up on the walton and meds that pt is asking to take her own. No other issues. Pt left with Kenrick, her day RN.

## 2023-05-05 NOTE — DISCHARGE INSTRUCTIONS
[facility]  Dr. Natividad Heck Post-Operative Instructions Lumbar Fusion    *YOU MUST AVOID SMOKING OR BEING AROUND ANYONE WHO SMOKES. AVOID ALL PRODUCTS THAT CONTAIN NICOTINE. DO NOT TAKE IBUPROFEN OR ANTI--INFLAMMATORIES, AS THESE MAY ALTER THE HEALING OF THE FUSION. ACTIVITIES :  *The first week after surgery   1. You may be up and walking about the house. 2.  Activities around the house, such as washing dishes, fixing light meals, and your own personal care are fine. 3.  Avoid strenuous activities, such as vacuuming, lifting laundry or grocery bags. 4.  Do not lift anything heavier than 1 gallon of milk (or about 5-8 pounds). 5.  Do not bend over to  items from the ground level until 3 months post-op. *Week 2 and beyond  1. You may gradually increase your activities, but still avoid heavy lifting, pushing/pulling. 2.  Walking is the best way to rebuild strength and stamina. Start SLOWLY and gradually increase the distance a little every week. 3.  Walk at a pace that avoids fatigue or severe pain. Do not try to walk several blocks the first day! As you increase the distance, you may feel tired. If so, stop and rest.   4.  You should be able to walk several blocks by your first clinic visit. 5.  Follow-up with Dr. Katherin Atkinson in 10-14 days. BATHING and INCISION CARE:  1. The incision may be tender to touch or feel numb: this is normal.   2.  Keep the incision clean and dry no showering until your follow-up appointment. The incision will be closed with sutures under the skin and the skin will be glued. 3.  Do not apply any lotions, ointments or oils on the incision. 4.  If you notice any excessive swelling, redness, or persistent drainage around the incision, notify the office immediately. DRIVIN. You should not drive until after your follow-up appointment.    2.  You can be in a vehicle for short distances, but if you travel any long distance, please stop about every 30 minutes and

## 2023-05-05 NOTE — DISCHARGE SUMMARY
Discharge Summary    Patient: Pasco Duane               Sex: female          DOA: 5/4/2023         YOB: 1965      Age:  59 y.o.        LOS:  LOS: 0 days                Admit Date: 5/4/2023    Discharge Date: 5/5/2023    Admission Diagnoses: Other spondylosis with radiculopathy, lumbar region [M47.26]  Acquired spondylolisthesis of lumbosacral region [M43.17]  Spinal stenosis of lumbar region at multiple levels [M48.061]    Discharge Diagnoses:  same as above    Discharge Condition:  stable    Hospital Course: The patient was transferred to the floor for post-operative and medical care. She   was alert and oriented times 3. She was neurologically intact in the lower extremities, and calves are non-tender. She was c/o of ongoing significant pain in which is stable and controlled by current meds . She will begin Physical therapy and will be up and ambulatory with their walker. Their Hemoglobin  was  stable. Their pain medications were continued for pain control. Later today, if She is thought to be medically and orthopaedically stable then they will be discharged. If she is unable to be discharged today than she will be seen by one of our partners tomorrow and discharged when appropriate. Consults: None    Significant Diagnostic Studies: none    Discharge Medications:     Current Discharge Medication List        START taking these medications    Details   HYDROmorphone (DILAUDID) 4 MG tablet Take 1 tablet by mouth every 4 hours as needed for Pain for up to 3 days.  Max Daily Amount: 24 mg  Qty: 28 tablet, Refills: 0    Comments: Reduce doses taken as pain becomes manageable  Associated Diagnoses: Spinal stenosis of lumbar region at multiple levels      cyclobenzaprine (FLEXERIL) 10 MG tablet Take 1 tablet by mouth 3 times daily as needed for Muscle spasms  Qty: 60 tablet, Refills: 0           CONTINUE these medications which have NOT CHANGED    Details   levothyroxine (SYNTHROID) 200 MCG

## 2023-05-05 NOTE — PROGRESS NOTES
0496 - Assessment complete. Patient alert and oriented x 4. Cap refills < 3 sec. Pulses palpable and equal bilaterally. Lung sounds clear bilaterally. Patient reports slight SOB. O2 sats noted 85% on room air. Nasal cannula applied at 3L. Abd soft and nontender. Bowel sounds active to all 4 quads. Mcclain intact and draining. Skin warm and dry. Drsg to lower back noted CDI. Hemovac drain intact and draining. Back brace in place. IV to right hand, site CDI. SCD's to BLE. Reports back pain \"severe\" at 7/10. Patient repositioned in bed. Offered tylenol and flexeril. Patient refused, stating \"I just cant take them right now\". Patient in bed with call light in reach. 1037 - PRN Dilaudid administered for 7/10 back pain. 1045 - Patient BP noted 92/46 and patient c/o lightheadedness. IV fluids increased from 75 ml/hr to 125 ml/hr per multiphase order. 1246 - BP noted 107/70.    1351 - Patient's BP noted 105/50. PRN Norco 1 tablet administered for 7/10 back pain. Patient repositioned in bed. Call light in reach. 1512 - PRN Flexeril administered for muscle spasms/pain to back. Patient repositioned in bed. Call light in reach. 200 - Spoke with Dr. Verner Baron and notified that patient is c/o of unrelieved severe pain and hallucinations. Patient's BP has been low normal and this nurse concerned about administering IV dilaudid. New orders received for Toradol 30 mg IV q6h and 1L fluid bolus. Dr. Verner Baron also stated norco can be changed back to oxycodone if patient does not feel norco is helping to relieve her pain. 1750 - Transferred to room 311.     1846 - Dr. Satish Sanchez on call and notified of low BP at 90/53. Ordered to give another 1L NS bolus. 1900 - Mcclain catheter removed. 1918 - Bedside and Verbal shift change report given to ASHOK Madsen (oncoming nurse) by THERESA Chawla RN (offgoing nurse). Report included the following information Surgery Report, Intake/Output, MAR, and Recent Results.

## 2023-05-05 NOTE — PROGRESS NOTES
Progress Note      Patient: Lara Leon               Sex: female          DOA: 5/4/2023         YOB: 1965      Age:  59 y.o.        LOS:  LOS: 0 days     Procedure: Procedure(s):  LUMBAR 3 SACRAL 1 LAMINECTOMY, INSTRUMENTED FUSION WITH CAGES WITH C-ARM OP 23            Subjective: Lara Leon is a 59 y.o. female who c/o ongoing significant pain in back which is stable and controlled by PRN meds      Objective:      Visit Vitals  BP (!) 110/58   Pulse 88   Temp 97.8 °F (36.6 °C) (Oral)   Resp 18   Ht 5' 6\" (1.676 m)   Wt 196 lb 14.4 oz (89.3 kg)   SpO2 96%   BMI 31.78 kg/m²       Physical Exam:  A&O x3  VSS  HF/GS/QUADS/EHL/TA 5/5 bilateral  Calves nontender    Dressing: C/D/I    Intake and Output:  Current Shift:  05/05 0701 - 05/05 1900  In: 350 [P.O.:350]  Out: 60 [Drains:60]  Last three shifts:  05/03 1901 - 05/05 0700  In: 3000 [I.V.:3000]  Out: 985 [Urine:400; Drains:210]    Drain Output:    Lab/Data Reviewed: All lab results for the last 24 hours reviewed. BMP:   Lab Results   Component Value Date/Time     05/05/2023 02:48 AM    K 3.9 05/05/2023 02:48 AM     05/05/2023 02:48 AM    CO2 27 05/05/2023 02:48 AM    ANIONGAP 3 05/05/2023 02:48 AM    GLUCOSE 129 05/05/2023 02:48 AM    BUN 9 05/05/2023 02:48 AM    CREATININE 0.67 05/05/2023 02:48 AM     CBC:   Lab Results   Component Value Date/Time    WBC 12.9 05/05/2023 02:48 AM    HGB 10.8 05/05/2023 02:48 AM    HCT 34.5 05/05/2023 02:48 AM     05/05/2023 02:48 AM       Medications Reviewed    Continued hospitalization is indicated due to s/p multilevel lumbar fusion      Assessment/Plan     Principal Problem:    Lumbar spinal stenosis  Active Problems:    Lumbar spondylosis    Radiculopathy of leg    Spinal stenosis of lumbar region at multiple levels  Resolved Problems:    * No resolved hospital problems.  *      S/p Lumbar fusion doing well  Complaining of significant pain however seems to be resting comfortably in

## 2023-05-05 NOTE — PROGRESS NOTES
Physical Therapy Goals:  Initiated 5/5/2023 to be met within 7-10 days. Short Term Goals  Time Frame for Short Term Goals: 2-4 days  Short Term Goal 1: 1. Patient will supine to/from sit with SBA. Short Term Goal 2: 2. Patient will perform sit<> stand transfers with SB/CGA. Short Term Goal 3: 3. Patient will ambulate 100 ft with RW/LSO and SB-CGA. Short Term Goal 4: 4. Patient will negotiate flight of stairs with handrails and CGA. []  Patient has met MD terence kumar for d/c home   []  Recommend HH with 24 hour adult care   [x]  Benefit from additional acute PT session to address:  possible rehab placement      PHYSICAL THERAPY TREATMENT    Patient: Balbir Garcia (59 y.o. female)  Date: 5/5/2023  Diagnosis: Other spondylosis with radiculopathy, lumbar region [M47.26]  Acquired spondylolisthesis of lumbosacral region [M43.17]  Spinal stenosis of lumbar region at multiple levels [M48.061] Lumbar spinal stenosis  Procedure(s) (LRB):  LUMBAR 3 SACRAL 1 LAMINECTOMY, INSTRUMENTED FUSION WITH CAGES WITH C-ARM OP 23 (N/A) 1 Day Post-Op  Precautions: Fall Risk, Other (comment) (Back; LSO when up)  PLOF: independent, ambulatory without AD, has a RW    ASSESSMENT:  Assessment  Assessment: Pt supine in bed upon arrival.  Increased VC needed throughout session due to grogginess. Log roll performed with Carlos, modA to sit up EOB. Pt perfers to done LSO in standing. Sit to stand performed with bed in lowest position. Pt able to don LSO in standing with Carlos and increased VCs. Ambulated 60ft with RW, very short steps, decreased step height, very slow pace, no LOB or path deviations. Increased encouragement needed to increase ambulation distance due to pt wanting to stop after 12 ft. Spouse assisted with encouragement. Pt returned to room, VC for doffing LSO, min/modA with LEs into bed and into supine.   SpO2 96% on 3L pre PT, 86% on RA s/p ambulaion, 2 minutes needed and increased VC with IS use to increase

## 2023-05-06 VITALS
TEMPERATURE: 98 F | BODY MASS INDEX: 31.64 KG/M2 | SYSTOLIC BLOOD PRESSURE: 129 MMHG | DIASTOLIC BLOOD PRESSURE: 69 MMHG | HEART RATE: 88 BPM | HEIGHT: 66 IN | WEIGHT: 196.9 LBS | RESPIRATION RATE: 18 BRPM | OXYGEN SATURATION: 93 %

## 2023-05-06 PROCEDURE — A4216 STERILE WATER/SALINE, 10 ML: HCPCS | Performed by: PHYSICIAN ASSISTANT

## 2023-05-06 PROCEDURE — 2500000003 HC RX 250 WO HCPCS: Performed by: PHYSICIAN ASSISTANT

## 2023-05-06 PROCEDURE — 97116 GAIT TRAINING THERAPY: CPT

## 2023-05-06 PROCEDURE — 2580000003 HC RX 258: Performed by: PHYSICIAN ASSISTANT

## 2023-05-06 PROCEDURE — 6370000000 HC RX 637 (ALT 250 FOR IP): Performed by: ORTHOPAEDIC SURGERY

## 2023-05-06 PROCEDURE — 6370000000 HC RX 637 (ALT 250 FOR IP): Performed by: PHYSICIAN ASSISTANT

## 2023-05-06 PROCEDURE — 2700000000 HC OXYGEN THERAPY PER DAY

## 2023-05-06 PROCEDURE — 6360000002 HC RX W HCPCS: Performed by: ORTHOPAEDIC SURGERY

## 2023-05-06 RX ORDER — KETOROLAC TROMETHAMINE 30 MG/ML
30 INJECTION, SOLUTION INTRAMUSCULAR; INTRAVENOUS EVERY 6 HOURS
Status: DISCONTINUED | OUTPATIENT
Start: 2023-05-06 | End: 2023-05-06 | Stop reason: SDUPTHER

## 2023-05-06 RX ORDER — KETOROLAC TROMETHAMINE 15 MG/ML
30 INJECTION, SOLUTION INTRAMUSCULAR; INTRAVENOUS EVERY 6 HOURS
Status: DISCONTINUED | OUTPATIENT
Start: 2023-05-07 | End: 2023-05-06

## 2023-05-06 RX ORDER — KETOROLAC TROMETHAMINE 15 MG/ML
30 INJECTION, SOLUTION INTRAMUSCULAR; INTRAVENOUS EVERY 6 HOURS
Status: COMPLETED | OUTPATIENT
Start: 2023-05-06 | End: 2023-05-06

## 2023-05-06 RX ADMIN — KETOROLAC TROMETHAMINE 30 MG: 15 INJECTION, SOLUTION INTRAMUSCULAR; INTRAVENOUS at 01:22

## 2023-05-06 RX ADMIN — KETOROLAC TROMETHAMINE 30 MG: 15 INJECTION, SOLUTION INTRAMUSCULAR; INTRAVENOUS at 05:59

## 2023-05-06 RX ADMIN — ZINC SULFATE 220 MG (50 MG) CAPSULE 50 MG: CAPSULE at 09:12

## 2023-05-06 RX ADMIN — HYDROCODONE BITARTRATE AND ACETAMINOPHEN 2 TABLET: 5; 325 TABLET ORAL at 05:03

## 2023-05-06 RX ADMIN — HYDROCODONE BITARTRATE AND ACETAMINOPHEN 2 TABLET: 5; 325 TABLET ORAL at 10:26

## 2023-05-06 RX ADMIN — HYDROCODONE BITARTRATE AND ACETAMINOPHEN 1 TABLET: 5; 325 TABLET ORAL at 01:08

## 2023-05-06 RX ADMIN — LEVOTHYROXINE SODIUM 200 MCG: 100 TABLET ORAL at 06:44

## 2023-05-06 RX ADMIN — FAMOTIDINE 20 MG: 10 INJECTION, SOLUTION INTRAVENOUS at 09:11

## 2023-05-06 RX ADMIN — KETOROLAC TROMETHAMINE 30 MG: 15 INJECTION, SOLUTION INTRAMUSCULAR; INTRAVENOUS at 12:10

## 2023-05-06 RX ADMIN — SODIUM CHLORIDE, PRESERVATIVE FREE 10 ML: 5 INJECTION INTRAVENOUS at 10:29

## 2023-05-06 RX ADMIN — CYCLOBENZAPRINE 10 MG: 10 TABLET, FILM COATED ORAL at 04:00

## 2023-05-06 ASSESSMENT — PAIN DESCRIPTION - LOCATION
LOCATION: BACK

## 2023-05-06 ASSESSMENT — PAIN DESCRIPTION - DESCRIPTORS
DESCRIPTORS: ACHING
DESCRIPTORS: ACHING;BURNING
DESCRIPTORS: ACHING
DESCRIPTORS: BURNING
DESCRIPTORS: ACHING
DESCRIPTORS: ACHING

## 2023-05-06 ASSESSMENT — PAIN SCALES - GENERAL
PAINLEVEL_OUTOF10: 9
PAINLEVEL_OUTOF10: 0
PAINLEVEL_OUTOF10: 8
PAINLEVEL_OUTOF10: 0
PAINLEVEL_OUTOF10: 8
PAINLEVEL_OUTOF10: 9

## 2023-05-06 ASSESSMENT — PAIN DESCRIPTION - ORIENTATION
ORIENTATION: LOWER
ORIENTATION: LOWER
ORIENTATION: MID
ORIENTATION: LOWER
ORIENTATION: LOWER
ORIENTATION: MID;LOWER

## 2023-05-06 ASSESSMENT — PAIN DESCRIPTION - PAIN TYPE: TYPE: SURGICAL PAIN

## 2023-05-06 NOTE — PROGRESS NOTES
Physical Therapy Goals:  Initiated 5/5/2023 to be met within 7-10 days. Short Term Goals  Time Frame for Short Term Goals: 2-4 days  Short Term Goal 1: 1. Patient will supine to/from sit with SBA. Short Term Goal 2: 2. Patient will perform sit<> stand transfers with SB/CGA. Short Term Goal 3: 3. Patient will ambulate 100 ft with RW/LSO and SB-CGA. Short Term Goal 4: 4. Patient will negotiate flight of stairs with handrails and CGA. []  Patient has met MD terence kumar for d/c home   []  Recommend HH with 24 hour adult care   [x]  Benefit from additional acute PT session to address:  Bed mobility and stairs      PHYSICAL THERAPY TREATMENT    Patient: Clifford Santiago (59 y.o. female)  Date: 5/5/2023  Diagnosis: Other spondylosis with radiculopathy, lumbar region [M47.26]  Acquired spondylolisthesis of lumbosacral region [M43.17]  Spinal stenosis of lumbar region at multiple levels [M48.061] Lumbar spinal stenosis  Procedure(s) (LRB):  LUMBAR 3 SACRAL 1 LAMINECTOMY, INSTRUMENTED FUSION WITH CAGES WITH C-ARM OP 23 (N/A) 1 Day Post-Op  Precautions: Fall Risk, Other (comment) (Back; LSO when up),  ,  ,  ,  ,  ,  ,        ASSESSMENT:  Pt In bed on arrival. Agrees to session. Relieved by reduced pain. Session starts with log roll x 3 reps to improve alignment. Min assist for self assisted push to sit. Fair understanding of brace (setup only). Greatly increased ambulation distance and ablr to perform 2 steps. Pt notes willingness to D/c to a hotel/rental, to avoid 3 flight home entry.  present and concern about pain management and pt returning to less motivated status. If plan is still for home, pt will need to increase stair mobility. Will F/u tomorrow.       Progression toward goals: Fair  [x]      Improving appropriately and progressing toward goals  []      Improving slowly and progressing toward goals  []      Not making progress toward goals and plan of care will be adjusted     PLAN:  Patient

## 2023-05-06 NOTE — PROGRESS NOTES
D/c plan: D/c home today via stretcher. Pt has 3 flights of stairs to get into her apartment. CM spoke w/ pt. Informed her transport will be arranged due to the number of stairs to get into the apartment and the pt's inability to climb that number of stairs. Discussed the [facility] that therapy has recommended. Confirmed the pt doesn't have one. Pt does have her RW at home. Informed her CM will send the [facility] order to [facility] and they will get it delivered to her apartment early next week which will be prior to her spouse flying back to [place]. Pt verbalized an understanding. Pt to d/c home today.

## 2023-05-06 NOTE — PROGRESS NOTES
00:30 Shift assessment completed. See nsg flow sheet for details. Declining Dilaudid r/t hallucinations    03:00 Reassessed with 0 changes noted. Back dsg remains C/D/I with CMS intact. Resting quietly in bed with eyes closed between cares x for voiding per BR w/o difficulty    07:25 Bedside and Verbal shift change report given to [id number] [facility] (oncoming nurse) by Yannick Way RN (offgoing nurse). Report included the following information Nurse Handoff Report.

## 2023-05-06 NOTE — PROGRESS NOTES
1920     Assumed care for patient after receiving handoff report. Patient seen on bed, Aox4, slightly agitated, not in apparent distress. 1945    Initial assessment and vital signs checked and recorded. Assisted patient with repositioning to the right side; given PRN pain medication as ordered. 2010     Assisted patient to the bathroom with walker and assisted back to bed thereafter, O2 cannula and SCDs hooked back. 2130     Patient wanting to have her entire beddings replaced; offered to sit on recliner first but refused; when told that this RN will get help from someone, patient got really upset with this RN. Also complained that \"IV needle was dislodged\" when I helped her repositioned on bed. Noted blood return from IV catheter but refused to have her IV access flushed. Provided reassurance but to no avail. Called RN supervisor to intervene and provide further reassurance. 2134    Called RN supervisor to talk and reassure patient.  came in and was very upset stating that her wife is not being attended to.     2140     RN supervisor put in a new IV access on patient and IVF restarted. 2228    Unable to pull out due Toradol from the Pyxis; called pharmacist on duty - said they will look at the order and follow up. RN supervisor made aware. Jocelin patient to restroom and back to bed; repositioning done. 2355    Ketorolac order modified and retimed but still not showing on Pyxis; overrode due dose. \"Wrong concentration scanned' prompt verified with pharmacist on duty prior to administering due dose. 0000     Handoff report given to Joslyn Gaytan RN.

## 2023-05-06 NOTE — PROGRESS NOTES
Physical Therapy    1122: Pt reports 7-8/10 pain, refusing PT at this time, will follow up.    1300: answered call light. assisted pt to the bathroom, nursing busy and transport arrived to take pt home. Pt met ambulation goal last session and negotiated 2 steps. Pt is getting medical transport home today due to having 3 flights of stairs. Pt is safe it d/c home with HHPT.

## 2023-05-06 NOTE — PLAN OF CARE
Problem: Pain  Goal: Verbalizes/displays adequate comfort level or baseline comfort level  5/6/2023 0555 by Gabino Johnson RN  Outcome: Progressing  Flowsheets (Taken 5/6/2023 0030)  Verbalizes/displays adequate comfort level or baseline comfort level:   Encourage patient to monitor pain and request assistance   Assess pain using appropriate pain scale   Administer analgesics based on type and severity of pain and evaluate response   Implement non-pharmacological measures as appropriate and evaluate response   Notify Licensed Independent Practitioner if interventions unsuccessful or patient reports new pain  5/5/2023 1855 by Shawnee Acevedo RN  Outcome: Progressing     Problem: Discharge Planning  Goal: Discharge to home or other facility with appropriate resources  5/6/2023 0555 by Gabino Johnson RN  Outcome: Progressing  5/5/2023 1855 by Shawnee Acevedo RN  Outcome: Progressing     Problem: Safety - Adult  Goal: Free from fall injury  5/6/2023 0555 by Gabino Johnson RN  Outcome: Progressing  5/5/2023 1855 by Shawnee Acevedo RN  Outcome: Progressing     Problem: ABCDS Injury Assessment  Goal: Absence of physical injury  5/6/2023 0555 by Gabino Johnson RN  Outcome: Progressing  5/5/2023 1855 by Shawnee Acevedo RN  Outcome: Progressing

## 2023-05-06 NOTE — PROGRESS NOTES
Progress Note POD #      Patient: Rubén Harmon               Sex: female          DOA: 5/4/2023         YOB: 1965      Surgery: Procedure(s):  LUMBAR 3 SACRAL 1 LAMINECTOMY, INSTRUMENTED FUSION WITH CAGES WITH C-ARM OP 23           LOS: 0 days               Subjective:     Right hip is sore from laying    Objective:      Visit Vitals  BP (!) 108/59   Pulse 87   Temp 97.6 °F (36.4 °C) (Oral)   Resp 22   Ht 5' 6\" (1.676 m)   Wt 196 lb 14.4 oz (89.3 kg)   SpO2 95%   BMI 31.78 kg/m²       Physical Exam:  Neurological: motor strength: 5/5 in lower extremities bilaterally                          sensation: intact to light touch      Intake and Output:  Current Shift:  No intake/output data recorded. Last three shifts:  05/04 1901 - 05/06 0700  In: 1050 [P.O.:450; I.V.:600]  Out: 2500 [Urine:2150; Drains:350]    Lab/Data Reviewed:    Lab/Data Reviewed:  Lab Results   Component Value Date/Time    WBC 12.9 05/05/2023 02:48 AM    HGB 10.8 05/05/2023 02:48 AM    HCT 34.5 05/05/2023 02:48 AM     05/05/2023 02:48 AM    MCV 98.0 05/05/2023 02:48 AM     Lab Results   Component Value Date/Time    APTT 32.4 04/26/2023 08:42 AM     Lab Results   Component Value Date/Time    INR 0.9 04/26/2023 08:42 AM            Assessment/Plan     Principal Problem:    Lumbar spinal stenosis  Active Problems:    Lumbar spondylosis    Radiculopathy of leg    Spinal stenosis of lumbar region at multiple levels  Resolved Problems:    * No resolved hospital problems. *      1. Stable  2. OOB with PT  3. D/C Planning for home today  4.  Discharge to home after being cleared by P.T  8. Follow-up in 10 days at [facility] with Dr. Cary Quezada

## 2023-05-06 NOTE — PROGRESS NOTES
1815--Patient called to say that she will need a walker at home. Will notify care manager. 1930-Patient does have a rolling walker at home per Rox Hung.

## 2023-05-06 NOTE — PROGRESS NOTES
Patient discharged in stable condition via medical transport after all discharge instructions reviewed, and patient made aware of all follow up visits. Patient shown how to empty and record Hemavac drainage. Opportunity for questions provided, and all questions answered.

## 2023-05-06 NOTE — PROGRESS NOTES
Attempted to reach patient several times after discharge to see if she has any questions or concerns. Unable to reach patient or emergency contact. 5/6/23, at 1730.

## 2023-05-08 NOTE — FLOWSHEET NOTE
Waiting to hear back from Dr. Ankit Yu office about pain medication. Patient wants to stop taking the Dilaudid and be put back on 2020 Isamar Oconnell has been out to the house already and worked with the patient. Reviewed the importance of taking a stool softener for constipation prevention. Patient stating she is using ice on her back for swelling and soreness. Up walking around but painful. Encouraged to eat for healing and to give her energy. Reminded of her back precautions. Encouraged to keep using ice for pain, swelling, and soreness 30 minutes on and off every hour she is awake. Patient stated she is using the incentive spirometer but encouraged to use it 10 times each hours she is awake to prevent pneumonia.

## (undated) DEVICE — HANDPIECE SET WITH HIGH FLOW TIP AND SUCTION TUBE: Brand: INTERPULSE

## (undated) DEVICE — GLOVE SURG SZ 65 L12IN FNGR THK79MIL GRN LTX FREE

## (undated) DEVICE — ADHESIVE SKIN CLSR 0.7ML TOP DERMBND ADV

## (undated) DEVICE — CORD BPLR L12FT DISP

## (undated) DEVICE — SYRINGE MED 5ML STD CLR PLAS LUERLOCK TIP N CTRL DISP

## (undated) DEVICE — SOLUTION IV 100ML 0.9% SOD CHL DIL INJ

## (undated) DEVICE — OPTIFOAM GENTLE SA, POSTOP, 4X8: Brand: MEDLINE

## (undated) DEVICE — GOWN,REINFORCED,POLY,XLARGE: Brand: MEDLINE

## (undated) DEVICE — SPONGE,NEURO,0.5"X1.5",XR,STRL,LF,10/PK: Brand: MEDLINE

## (undated) DEVICE — JACKSON TABLE POSITIONER KIT: Brand: MEDLINE INDUSTRIES, INC.

## (undated) DEVICE — SYRINGE IRRIG 60ML SFT PLIABLE BLB EZ TO GRP 1 HND USE W/

## (undated) DEVICE — SUTURE VCRL + SZ 1 L18IN ABSRB UD L36MM CT-1 1/2 CIR VCP841D

## (undated) DEVICE — SYRINGE MED 30ML STD CLR PLAS LUERLOCK TIP N CTRL DISP

## (undated) DEVICE — GARMENT,MEDLINE,DVT,INT,CALF,MED, GEN2: Brand: MEDLINE

## (undated) DEVICE — SYSTEM SKIN CLSR 22CM DERMBND PRINEO

## (undated) DEVICE — GAUZE,SPONGE,8"X4",12PLY,XRAY,STRL,LF: Brand: MEDLINE

## (undated) DEVICE — SUTURE VCRL + SZ 2-0 L18IN ABSRB VLT CT-2 1/2 CIR TAPERCUT VCP726D

## (undated) DEVICE — TOTAL TRAY, DB, 100% SILI FOLEY, 16FR 10: Brand: MEDLINE

## (undated) DEVICE — SOLUTION IRRIG 3000ML LAC R FLX CONT

## (undated) DEVICE — 3.0MM PRECISION NEURO (MATCH HEAD)

## (undated) DEVICE — AGENT HEMSTAT 1GM PORCINE GEL ABSRB PWD FOR CONT OOZING

## (undated) DEVICE — SYRINGE MED 3ML CLR PLAS STD N CTRL LUERLOCK TIP DISP

## (undated) DEVICE — GLOVE SURG SZ 65 CRM LTX FREE POLYISOPRENE POLYMER BEAD ANTI

## (undated) DEVICE — CATHETER IV 14GA L1.75IN OD2.146MM ID1.740MM ORNG VIALON

## (undated) DEVICE — 5.0MM X-COARSE DIAMOND

## (undated) DEVICE — GLOVE SURG SZ 7 L12IN FNGR THK79MIL GRN LTX FREE

## (undated) DEVICE — DECANTING SET

## (undated) DEVICE — SUTURE STRATAFIX SZ 3-0 L30CM NONABSORBABLE UD L19MM FS-2 SXMP2B408

## (undated) DEVICE — BOWL ASSY BM210 DUAL BLADE DISPOSABLE: Brand: MIDAS REX™

## (undated) DEVICE — KIT EVAC 0.13IN RECT TB DIA10FR 400CC PVC 3 SPR Y CONN DRN

## (undated) DEVICE — SOLUTION IRRIG 500ML 0.9% SOD CHLO USP POUR PLAS BTL

## (undated) DEVICE — GLOVE SURG SZ 9 THK91MIL LTX FREE SYN POLYISOPRENE ANTI

## (undated) DEVICE — APPLICATOR MEDICATED 26 CC SOLUTION HI LT ORNG CHLORAPREP

## (undated) DEVICE — NEEDLE HYPO 18GA L1.5IN PNK POLYPR HUB S STL THN WALL FILL

## (undated) DEVICE — PACK PROCEDURE SURG LAMINECTOMY SPINE CUST